# Patient Record
Sex: MALE | Race: WHITE | NOT HISPANIC OR LATINO | Employment: OTHER | URBAN - METROPOLITAN AREA
[De-identification: names, ages, dates, MRNs, and addresses within clinical notes are randomized per-mention and may not be internally consistent; named-entity substitution may affect disease eponyms.]

---

## 2023-11-02 DIAGNOSIS — D3A.8 NEUROENDOCRINE TUMOR OF PANCREAS: Primary | ICD-10-CM

## 2023-11-22 ENCOUNTER — OFFICE VISIT (OUTPATIENT)
Dept: SURGICAL ONCOLOGY | Facility: CLINIC | Age: 78
End: 2023-11-22
Payer: OTHER GOVERNMENT

## 2023-11-22 VITALS
SYSTOLIC BLOOD PRESSURE: 103 MMHG | BODY MASS INDEX: 28.51 KG/M2 | HEART RATE: 80 BPM | HEIGHT: 71 IN | DIASTOLIC BLOOD PRESSURE: 62 MMHG | WEIGHT: 203.63 LBS

## 2023-11-22 DIAGNOSIS — D3A.8 NEUROENDOCRINE TUMOR OF PANCREAS: Primary | ICD-10-CM

## 2023-11-22 DIAGNOSIS — D3A.8 NEUROENDOCRINE TUMOR OF PANCREAS: ICD-10-CM

## 2023-11-22 PROCEDURE — 99999 PR PBB SHADOW E&M-EST. PATIENT-LVL IV: ICD-10-PCS | Mod: PBBFAC,,, | Performed by: SURGERY

## 2023-11-22 PROCEDURE — 99205 OFFICE O/P NEW HI 60 MIN: CPT | Mod: S$PBB,,, | Performed by: SURGERY

## 2023-11-22 PROCEDURE — 99214 OFFICE O/P EST MOD 30 MIN: CPT | Mod: PBBFAC | Performed by: SURGERY

## 2023-11-22 PROCEDURE — 99205 PR OFFICE/OUTPT VISIT, NEW, LEVL V, 60-74 MIN: ICD-10-PCS | Mod: S$PBB,,, | Performed by: SURGERY

## 2023-11-22 PROCEDURE — 99999 PR PBB SHADOW E&M-EST. PATIENT-LVL IV: CPT | Mod: PBBFAC,,, | Performed by: SURGERY

## 2023-11-22 RX ORDER — ACETAMINOPHEN 500 MG
1 TABLET ORAL EVERY MORNING
COMMUNITY

## 2023-11-22 RX ORDER — LOSARTAN POTASSIUM 100 MG/1
100 TABLET ORAL DAILY
COMMUNITY
Start: 2023-09-19

## 2023-11-22 RX ORDER — DENOSUMAB 60 MG/ML
INJECTION SUBCUTANEOUS
COMMUNITY
End: 2024-02-07

## 2023-11-22 RX ORDER — DIPHENOXYLATE HYDROCHLORIDE AND ATROPINE SULFATE 2.5; .025 MG/1; MG/1
1 TABLET ORAL EVERY 6 HOURS PRN
COMMUNITY
Start: 2023-06-15 | End: 2024-02-07

## 2023-11-22 RX ORDER — METOPROLOL SUCCINATE 25 MG/1
TABLET, EXTENDED RELEASE ORAL
COMMUNITY
End: 2024-02-07

## 2023-11-22 RX ORDER — FLUTICASONE PROPIONATE 50 MCG
SPRAY, SUSPENSION (ML) NASAL
COMMUNITY
Start: 2023-09-19 | End: 2024-02-07

## 2023-11-22 RX ORDER — ALLOPURINOL 100 MG/1
100 TABLET ORAL DAILY
COMMUNITY
Start: 2023-09-19

## 2023-11-22 RX ORDER — LORATADINE 10 MG/1
10 TABLET ORAL DAILY
COMMUNITY

## 2023-11-22 RX ORDER — ATORVASTATIN CALCIUM 80 MG/1
40 TABLET, FILM COATED ORAL
COMMUNITY
Start: 2023-09-19 | End: 2024-02-07

## 2023-11-22 RX ORDER — ATENOLOL 50 MG/1
50 TABLET ORAL 2 TIMES DAILY
COMMUNITY
Start: 2023-09-19

## 2023-11-22 RX ORDER — FLECAINIDE ACETATE 100 MG/1
50 TABLET ORAL
COMMUNITY
Start: 2023-09-19 | End: 2024-02-07

## 2023-11-22 RX ORDER — HYDROCHLOROTHIAZIDE 25 MG/1
25 TABLET ORAL
COMMUNITY
Start: 2023-09-19 | End: 2024-02-07

## 2023-11-22 NOTE — PROGRESS NOTES
History & Physical    CHIEF COMPLAINT:  NEUROENDOCRINE TUMOR OF DUODENUM     History of Present Illness:  78 year-old-male referred by Dr. Sheila Butts.  Patient presented to an outside facility in July 2023 with complaints of weakness, decreased appetite and jaundice x 3 weeks.  Abdominal ultrasound showed findings suspicious for distal CBD obstruction secondary to a pancreatic head mass.  CT abd/pel without contrast then showed a suspected ampullary mass or stone causing distal CBD obstruction.  Patient was transferred to [facility] in [place].  MRI done there showed a 2.3 x 3.5 cm mass at the ampulla of Vater causing common bile duct and main pancreatic duct obstruction.  ERCP was performed, the duodenum just proximal to ampulla was ulcerated and infiltrated with tumor; appearance of secondary invasion of the duodenum by pancreatic cancer; distal stricture in the area where the common bile duct passes through the pancreatic head; a 10 mm x 6 cm fully covered metal stent was placed.  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  Bile duct brushings were negative for malignant cells.  CEA at the time of workup was 3.2,  was 2416.  Patient was referred to Dr. Epsarza for EUS however he was unsure how EUS would help with workup as the patient already had stent placement and biopsies performed.  At discharge from the hospital in July, patient requested Hospice services.  He has not had any further workup done since hospital stay.      Greater than 60 minutes was required for complete chart review, imaging review including interpretation of imaging, coordination with referring/other physicians, patient counseling regarding diagnosis/treatment plan, answering questions, medical decision making, and documentation.    Review of patient's allergies indicates:   Allergen Reactions    Influenza virus vaccine, specific Other (See  Comments)       Current Outpatient Medications   Medication Sig Dispense Refill    allopurinoL (ZYLOPRIM) 100 MG tablet 100 mg.      atenoloL (TENORMIN) 50 MG tablet 50 mg.      atorvastatin (LIPITOR) 80 MG tablet 40 mg.      diphenoxylate-atropine 2.5-0.025 mg (LOMOTIL) 2.5-0.025 mg per tablet Take 1 tablet by mouth every 6 (six) hours as needed.      flecainide (TAMBOCOR) 100 MG Tab 50 mg.      fluticasone propionate (FLONASE) 50 mcg/actuation nasal spray USE 1 SPRAY IN EACH NOSTRIL EVERY DAY FOR NASAL ALLERGY      hydroCHLOROthiazide (HYDRODIURIL) 25 MG tablet 25 mg.      losartan (COZAAR) 100 MG tablet 100 mg.      metformin HCl (METFORMIN ORAL) Take 500 mg by mouth once daily.      apixaban (ELIQUIS) 5 mg Tab Take 1 tablet twice a day by oral route for 30 days.      cholecalciferol, vitamin D3, 125 mcg (5,000 unit) Tab Take 1 tablet by mouth every morning.      denosumab (PROLIA) 60 mg/mL Syrg 0 subcutaneously every 6 months      loratadine (CLARITIN) 10 mg tablet       metoprolol succinate (TOPROL-XL) 25 MG 24 hr tablet 1/2 tab(s) orally once a day      VITAMIN B COMPLEX ORAL Take 1 tablet by mouth every morning.       No current facility-administered medications for this visit.       Past Medical History:   Diagnosis Date    POLO (acute kidney injury)     HLD (hyperlipidemia)     HTN (hypertension)     Prostate cancer     PVD (peripheral vascular disease)     Sick sinus syndrome      Past Surgical History:   Procedure Laterality Date    INSERTION OF PACEMAKER       No family history on file.        Review of Systems:  Review of Systems   Constitutional:  Negative for activity change, appetite change, chills, diaphoresis, fatigue and fever.   HENT:  Negative for congestion, ear pain, tinnitus and trouble swallowing.    Eyes:  Negative for photophobia and pain.   Respiratory:  Negative for apnea, cough, choking, chest tightness, shortness of breath and stridor.    Cardiovascular:  Negative for chest pain,  palpitations and leg swelling.   Endocrine: Negative for cold intolerance and heat intolerance.   Genitourinary:  Negative for difficulty urinating, dysuria, enuresis, flank pain, frequency and hematuria.   Musculoskeletal:  Negative for arthralgias, back pain and gait problem.   Neurological:  Negative for dizziness, seizures, syncope, facial asymmetry, speech difficulty, weakness, light-headedness, numbness and headaches.   Psychiatric/Behavioral:  Negative for agitation, behavioral problems, confusion and decreased concentration.    All other systems reviewed and are negative.      OBJECTIVE:     Vital Signs (Most Recent)              Physical Exam:  Physical Exam  Constitutional:       General: He is not in acute distress.     Appearance: Normal appearance. He is well-developed and normal weight. He is not ill-appearing, toxic-appearing or diaphoretic.   HENT:      Head: Normocephalic and atraumatic.      Right Ear: Hearing and external ear normal.      Left Ear: Hearing and external ear normal.      Nose: No congestion or rhinorrhea.      Mouth/Throat:      Mouth: Mucous membranes are moist.      Pharynx: Oropharynx is clear. No oropharyngeal exudate.   Eyes:      General: Lids are normal. Gaze aligned appropriately. No scleral icterus.     Extraocular Movements: Extraocular movements intact.      Right eye: Normal extraocular motion and no nystagmus.      Left eye: Normal extraocular motion and no nystagmus.      Conjunctiva/sclera: Conjunctivae normal.      Right eye: Right conjunctiva is not injected.      Left eye: Left conjunctiva is not injected.      Pupils: Pupils are equal, round, and reactive to light.   Neck:      Vascular: No carotid bruit or JVD.      Trachea: Trachea and phonation normal.   Cardiovascular:      Rate and Rhythm: Normal rate and regular rhythm.      Pulses: Normal pulses.      Heart sounds: Normal heart sounds. No murmur heard.     No friction rub. No gallop.   Pulmonary:       Effort: Pulmonary effort is normal. No tachypnea, accessory muscle usage, respiratory distress or retractions.      Breath sounds: Normal breath sounds and air entry. No stridor or decreased air movement. No wheezing or rhonchi.   Chest:      Chest wall: No mass, deformity, swelling, tenderness or crepitus.   Abdominal:      General: Abdomen is flat. Bowel sounds are normal. There is no distension. There are no signs of injury.      Palpations: Abdomen is soft. There is no shifting dullness, fluid wave, hepatomegaly, splenomegaly or mass.      Tenderness: There is no abdominal tenderness. There is no guarding or rebound.      Hernia: No hernia is present.   Musculoskeletal:         General: No swelling or tenderness.      Cervical back: Normal range of motion and neck supple. No rigidity, tenderness or crepitus.   Lymphadenopathy:      Head:      Right side of head: No submental, submandibular or occipital adenopathy.      Left side of head: No submental, submandibular or occipital adenopathy.      Cervical: No cervical adenopathy.      Right cervical: No superficial or posterior cervical adenopathy.     Left cervical: No superficial or posterior cervical adenopathy.      Upper Body:      Right upper body: No supraclavicular or axillary adenopathy.      Left upper body: No supraclavicular or axillary adenopathy.      Lower Body: No right inguinal adenopathy. No left inguinal adenopathy.   Skin:     General: Skin is warm.      Capillary Refill: Capillary refill takes less than 2 seconds.      Coloration: Skin is not cyanotic, jaundiced, mottled or pale.      Findings: No bruising, ecchymosis, erythema, lesion, petechiae or rash.      Nails: There is no clubbing.   Neurological:      General: No focal deficit present.      Mental Status: He is alert and oriented to person, place, and time.      Cranial Nerves: No cranial nerve deficit or facial asymmetry.      Sensory: No sensory deficit.      Motor: No weakness,  tremor, atrophy or abnormal muscle tone.      Coordination: Coordination normal.      Gait: Gait normal.      Deep Tendon Reflexes: Reflexes normal.   Psychiatric:         Attention and Perception: Attention and perception normal.         Mood and Affect: Mood normal. Mood is not anxious or depressed. Affect is not blunt or flat.         Speech: Speech normal. Speech is not slurred.         Behavior: Behavior normal. Behavior is cooperative.           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  Previous imaging showed no evidence of metastatic disease but this was several months ago.    Diagnosis, staging, prognosis and treatment guidelines for high-grade pancreatic neuroendocrine cancer were discussed with the patient in detail, all questions were addressed.Using visual aids and drawings, I described the anatomy and potential surgical procedures.  I explained that surgical resection is indicated and recommended for high-grade lesions.  He is unsure whether he would like to proceed with surgical intervention.    Update MRI of the abdomen with pancreas protocol, CT scan of the chest without contrast    Return to clinic after imaging to discuss treatment plan    Saeed Heredia MD  Surgical Oncology  Complex General, Gastrointestinal and Hepatobiliary Surgery

## 2023-11-27 ENCOUNTER — DOCUMENTATION ONLY (OUTPATIENT)
Dept: SURGICAL ONCOLOGY | Facility: CLINIC | Age: 78
End: 2023-11-27

## 2023-11-27 NOTE — PROGRESS NOTES
Per APPT desk, Caroline Noble from [facility] tried contacting patient to schedule his MRI/CT with their office and wasn't able to reach the patient. She left a voicemail for him to return her call. Once this has been scheduled and patient has been reached, I will schedule rtc. cpl

## 2023-12-26 ENCOUNTER — OFFICE VISIT (OUTPATIENT)
Dept: SURGICAL ONCOLOGY | Facility: CLINIC | Age: 78
End: 2023-12-26
Payer: OTHER GOVERNMENT

## 2023-12-26 VITALS
DIASTOLIC BLOOD PRESSURE: 72 MMHG | BODY MASS INDEX: 27.91 KG/M2 | HEIGHT: 71 IN | WEIGHT: 199.38 LBS | HEART RATE: 72 BPM | SYSTOLIC BLOOD PRESSURE: 117 MMHG

## 2023-12-26 DIAGNOSIS — Z01.818 PREOP TESTING: ICD-10-CM

## 2023-12-26 DIAGNOSIS — D3A.8 NEUROENDOCRINE TUMOR OF PANCREAS: Primary | ICD-10-CM

## 2023-12-26 PROCEDURE — 99215 PR OFFICE/OUTPT VISIT, EST, LEVL V, 40-54 MIN: ICD-10-PCS | Mod: S$PBB,,, | Performed by: SURGERY

## 2023-12-26 PROCEDURE — 99999 PR PBB SHADOW E&M-EST. PATIENT-LVL III: ICD-10-PCS | Mod: PBBFAC,,, | Performed by: SURGERY

## 2023-12-26 PROCEDURE — 99213 OFFICE O/P EST LOW 20 MIN: CPT | Mod: PBBFAC | Performed by: SURGERY

## 2023-12-26 PROCEDURE — 99999 PR PBB SHADOW E&M-EST. PATIENT-LVL III: CPT | Mod: PBBFAC,,, | Performed by: SURGERY

## 2023-12-26 PROCEDURE — 99215 OFFICE O/P EST HI 40 MIN: CPT | Mod: S$PBB,,, | Performed by: SURGERY

## 2023-12-26 NOTE — PROGRESS NOTES
History & Physical    CHIEF COMPLAINT:  NEUROENDOCRINE TUMOR OF DUODENUM     History of Present Illness:  78 year-old-male referred by Dr. Sheila Butts.  Patient presented to an outside facility in July 2023 with complaints of weakness, decreased appetite and jaundice x 3 weeks.  Abdominal ultrasound showed findings suspicious for distal CBD obstruction secondary to a pancreatic head mass.  CT abd/pel without contrast then showed a suspected ampullary mass or stone causing distal CBD obstruction.  Patient was transferred to [facility] in [place].  MRI done there showed a 2.3 x 3.5 cm mass at the ampulla of Vater causing common bile duct and main pancreatic duct obstruction.  ERCP was performed, the duodenum just proximal to ampulla was ulcerated and infiltrated with tumor; appearance of secondary invasion of the duodenum by pancreatic cancer; distal stricture in the area where the common bile duct passes through the pancreatic head; a 10 mm x 6 cm fully covered metal stent was placed.  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  Bile duct brushings were negative for malignant cells.  CEA at the time of workup was 3.2,  was 2416.  Patient was referred to Dr. Esparza for EUS however he was unsure how EUS would help with workup as the patient already had stent placement and biopsies performed.      The patient returns today after undergoing updated MRI of the abdomen with pancreas protocol and CT scan of the chest, I personally reviewed and interpreted the images and report.  There is duodenal wall thickening without evidence of progression of disease, no obvious lymphadenopathy, no evidence of liver either metastasis.  No evidence of intrathoracic metastasis.  I discussed these results with him today.    Greater than 60 minutes was required for complete chart review, imaging review including interpretation of imaging,  coordination with referring/other physicians, patient counseling regarding diagnosis/treatment plan, answering questions, medical decision making, and documentation.    Review of patient's allergies indicates:   Allergen Reactions    Influenza virus vaccine, specific Other (See Comments)       Current Outpatient Medications   Medication Sig Dispense Refill    allopurinoL (ZYLOPRIM) 100 MG tablet 100 mg.      apixaban (ELIQUIS) 5 mg Tab Take 1 tablet twice a day by oral route for 30 days.      atenoloL (TENORMIN) 50 MG tablet 50 mg.      atorvastatin (LIPITOR) 80 MG tablet 40 mg.      cholecalciferol, vitamin D3, 125 mcg (5,000 unit) Tab Take 1 tablet by mouth every morning.      denosumab (PROLIA) 60 mg/mL Syrg 0 subcutaneously every 6 months      diphenoxylate-atropine 2.5-0.025 mg (LOMOTIL) 2.5-0.025 mg per tablet Take 1 tablet by mouth every 6 (six) hours as needed.      flecainide (TAMBOCOR) 100 MG Tab 50 mg.      fluticasone propionate (FLONASE) 50 mcg/actuation nasal spray USE 1 SPRAY IN EACH NOSTRIL EVERY DAY FOR NASAL ALLERGY      hydroCHLOROthiazide (HYDRODIURIL) 25 MG tablet 25 mg.      loratadine (CLARITIN) 10 mg tablet       losartan (COZAAR) 100 MG tablet 100 mg.      metformin HCl (METFORMIN ORAL) Take 500 mg by mouth once daily.      metoprolol succinate (TOPROL-XL) 25 MG 24 hr tablet 1/2 tab(s) orally once a day      VITAMIN B COMPLEX ORAL Take 1 tablet by mouth every morning.       No current facility-administered medications for this visit.       Past Medical History:   Diagnosis Date    POLO (acute kidney injury)     HLD (hyperlipidemia)     HTN (hypertension)     Prostate cancer     PVD (peripheral vascular disease)     Sick sinus syndrome      Past Surgical History:   Procedure Laterality Date    INSERTION OF PACEMAKER       History reviewed. No pertinent family history.  Social History     Tobacco Use    Smoking status: Former     Types: Cigarettes     Passive exposure: Past    Smokeless  "tobacco: Never    Tobacco comments:     Quit over 30 yrs ago        Review of Systems:  Review of Systems   Constitutional:  Negative for activity change, appetite change, chills, diaphoresis, fatigue and fever.   HENT:  Negative for congestion, ear pain, tinnitus and trouble swallowing.    Eyes:  Negative for photophobia and pain.   Respiratory:  Negative for apnea, cough, choking, chest tightness, shortness of breath and stridor.    Cardiovascular:  Negative for chest pain, palpitations and leg swelling.   Endocrine: Negative for cold intolerance and heat intolerance.   Genitourinary:  Negative for difficulty urinating, dysuria, enuresis, flank pain, frequency and hematuria.   Musculoskeletal:  Negative for arthralgias, back pain and gait problem.   Neurological:  Negative for dizziness, seizures, syncope, facial asymmetry, speech difficulty, weakness, light-headedness, numbness and headaches.   Psychiatric/Behavioral:  Negative for agitation, behavioral problems, confusion and decreased concentration.    All other systems reviewed and are negative.      OBJECTIVE:     Vital Signs (Most Recent)  Pulse: 72 (12/26/23 1449)  BP: 117/72 (12/26/23 1449)  5' 11" (1.803 m)  90.4 kg (199 lb 6.4 oz)     Physical Exam:  Physical Exam  Constitutional:       General: He is not in acute distress.     Appearance: Normal appearance. He is well-developed and normal weight. He is not ill-appearing, toxic-appearing or diaphoretic.   HENT:      Head: Normocephalic and atraumatic.      Right Ear: Hearing and external ear normal.      Left Ear: Hearing and external ear normal.      Nose: No congestion or rhinorrhea.      Mouth/Throat:      Mouth: Mucous membranes are moist.      Pharynx: Oropharynx is clear. No oropharyngeal exudate.   Eyes:      General: Lids are normal. Gaze aligned appropriately. No scleral icterus.     Extraocular Movements: Extraocular movements intact.      Right eye: Normal extraocular motion and no nystagmus. "      Left eye: Normal extraocular motion and no nystagmus.      Conjunctiva/sclera: Conjunctivae normal.      Right eye: Right conjunctiva is not injected.      Left eye: Left conjunctiva is not injected.      Pupils: Pupils are equal, round, and reactive to light.   Neck:      Vascular: No carotid bruit or JVD.      Trachea: Trachea and phonation normal.   Cardiovascular:      Rate and Rhythm: Normal rate and regular rhythm.      Pulses: Normal pulses.      Heart sounds: Normal heart sounds. No murmur heard.     No friction rub. No gallop.   Pulmonary:      Effort: Pulmonary effort is normal. No tachypnea, accessory muscle usage, respiratory distress or retractions.      Breath sounds: Normal breath sounds and air entry. No stridor or decreased air movement. No wheezing or rhonchi.   Chest:      Chest wall: No mass, deformity, swelling, tenderness or crepitus.   Abdominal:      General: Abdomen is flat. Bowel sounds are normal. There is no distension. There are no signs of injury.      Palpations: Abdomen is soft. There is no shifting dullness, fluid wave, hepatomegaly, splenomegaly or mass.      Tenderness: There is no abdominal tenderness. There is no guarding or rebound.      Hernia: No hernia is present.   Musculoskeletal:         General: No swelling or tenderness.      Cervical back: Normal range of motion and neck supple. No rigidity, tenderness or crepitus.   Lymphadenopathy:      Head:      Right side of head: No submental, submandibular or occipital adenopathy.      Left side of head: No submental, submandibular or occipital adenopathy.      Cervical: No cervical adenopathy.      Right cervical: No superficial or posterior cervical adenopathy.     Left cervical: No superficial or posterior cervical adenopathy.      Upper Body:      Right upper body: No supraclavicular or axillary adenopathy.      Left upper body: No supraclavicular or axillary adenopathy.      Lower Body: No right inguinal adenopathy. No  left inguinal adenopathy.   Skin:     General: Skin is warm.      Capillary Refill: Capillary refill takes less than 2 seconds.      Coloration: Skin is not cyanotic, jaundiced, mottled or pale.      Findings: No bruising, ecchymosis, erythema, lesion, petechiae or rash.      Nails: There is no clubbing.   Neurological:      General: No focal deficit present.      Mental Status: He is alert and oriented to person, place, and time.      Cranial Nerves: No cranial nerve deficit or facial asymmetry.      Sensory: No sensory deficit.      Motor: No weakness, tremor, atrophy or abnormal muscle tone.      Coordination: Coordination normal.      Gait: Gait normal.      Deep Tendon Reflexes: Reflexes normal.   Psychiatric:         Attention and Perception: Attention and perception normal.         Mood and Affect: Mood normal. Mood is not anxious or depressed. Affect is not blunt or flat.         Speech: Speech normal. Speech is not slurred.         Behavior: Behavior normal. Behavior is cooperative.           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  No evidence of metastatic disease on updated imaging    Diagnosis, staging, prognosis and treatment guidelines for high-grade pancreatic neuroendocrine cancer were discussed with the patient in detail, all questions were addressed.Using visual aids and drawings, I described the anatomy and potential surgical procedures.  I explained that surgical resection is indicated and recommended for high-grade lesions.      Preoperative cardiac evaluation    Schedule laparoscopic/robotic pancreaticoduodenectomy/Whipple procedure    The risks and benefits of the procedure were explained in detail using layman terms, including the pros and cons of any implant that may be used, all questions were addressed, the patient gives consent to proceed      Saeed Heredia MD  Surgical Oncology  Complex  General, Gastrointestinal and Hepatobiliary Surgery

## 2024-01-25 DIAGNOSIS — D3A.8 NEUROENDOCRINE TUMOR OF PANCREAS: Primary | ICD-10-CM

## 2024-01-25 DIAGNOSIS — D3A.8 NEUROENDOCRINE TUMOR: ICD-10-CM

## 2024-01-25 RX ORDER — ALVIMOPAN 12 MG/1
12 CAPSULE ORAL ONCE
Status: CANCELLED | OUTPATIENT
Start: 2024-02-19 | End: 2024-02-25

## 2024-01-25 RX ORDER — ENOXAPARIN SODIUM 300 MG/3ML
30 INJECTION INTRAVENOUS; SUBCUTANEOUS EVERY 24 HOURS
Status: CANCELLED | OUTPATIENT
Start: 2024-01-25

## 2024-01-25 RX ORDER — HYDROCODONE BITARTRATE AND ACETAMINOPHEN 500; 5 MG/1; MG/1
TABLET ORAL
Status: CANCELLED | OUTPATIENT
Start: 2024-02-19

## 2024-02-07 ENCOUNTER — HOSPITAL ENCOUNTER (OUTPATIENT)
Dept: RADIOLOGY | Facility: HOSPITAL | Age: 79
Discharge: HOME | End: 2024-02-07
Attending: SURGERY
Payer: MEDICARE

## 2024-02-07 ENCOUNTER — OFFICE VISIT (OUTPATIENT)
Dept: SURGICAL ONCOLOGY | Facility: CLINIC | Age: 79
End: 2024-02-07
Payer: OTHER GOVERNMENT

## 2024-02-07 VITALS
HEIGHT: 70 IN | WEIGHT: 191.81 LBS | DIASTOLIC BLOOD PRESSURE: 75 MMHG | BODY MASS INDEX: 27.46 KG/M2 | SYSTOLIC BLOOD PRESSURE: 122 MMHG

## 2024-02-07 DIAGNOSIS — D3A.8 NEUROENDOCRINE TUMOR OF PANCREAS: Primary | ICD-10-CM

## 2024-02-07 DIAGNOSIS — Z01.818 PREOP TESTING: ICD-10-CM

## 2024-02-07 DIAGNOSIS — D3A.8 NEUROENDOCRINE TUMOR OF PANCREAS: ICD-10-CM

## 2024-02-07 PROCEDURE — 99213 OFFICE O/P EST LOW 20 MIN: CPT | Mod: PBBFAC,25 | Performed by: SURGERY

## 2024-02-07 PROCEDURE — 99214 OFFICE O/P EST MOD 30 MIN: CPT | Mod: S$PBB,,, | Performed by: SURGERY

## 2024-02-07 PROCEDURE — 71045 X-RAY EXAM CHEST 1 VIEW: CPT | Mod: TC

## 2024-02-07 PROCEDURE — 99999 PR PBB SHADOW E&M-EST. PATIENT-LVL III: CPT | Mod: PBBFAC,,, | Performed by: SURGERY

## 2024-02-07 NOTE — H&P (VIEW-ONLY)
History & Physical    CHIEF COMPLAINT:  NEUROENDOCRINE TUMOR OF DUODENUM     History of Present Illness:  78 year-old-male referred by Dr. Sheila Butts.  Patient presented to an outside facility in July 2023 with complaints of weakness, decreased appetite and jaundice x 3 weeks.  Abdominal ultrasound showed findings suspicious for distal CBD obstruction secondary to a pancreatic head mass.  CT abd/pel without contrast then showed a suspected ampullary mass or stone causing distal CBD obstruction.  Patient was transferred to [facility] in [place].  MRI done there showed a 2.3 x 3.5 cm mass at the ampulla of Vater causing common bile duct and main pancreatic duct obstruction.  ERCP was performed, the duodenum just proximal to ampulla was ulcerated and infiltrated with tumor; appearance of secondary invasion of the duodenum by pancreatic cancer; distal stricture in the area where the common bile duct passes through the pancreatic head; a 10 mm x 6 cm fully covered metal stent was placed.  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  Bile duct brushings were negative for malignant cells.  CEA at the time of workup was 3.2,  was 2416.  Patient was referred to Dr. Esparza for EUS however he was unsure how EUS would help with workup as the patient already had stent placement and biopsies performed.      The patient returns today after undergoing updated MRI of the abdomen with pancreas protocol and CT scan of the chest, I personally reviewed and interpreted the images and report.  There is duodenal wall thickening without evidence of progression of disease, no obvious lymphadenopathy, no evidence of liver either metastasis.  No evidence of intrathoracic metastasis.  I discussed these results with him today.    Greater than 60 minutes was required for complete chart review, imaging review including interpretation of imaging,  coordination with referring/other physicians, patient counseling regarding diagnosis/treatment plan, answering questions, medical decision making, and documentation.    Review of patient's allergies indicates:   Allergen Reactions    Influenza virus vaccine, specific Other (See Comments)       Current Outpatient Medications   Medication Sig Dispense Refill    allopurinoL (ZYLOPRIM) 100 MG tablet 100 mg.      atenoloL (TENORMIN) 50 MG tablet 50 mg.      cholecalciferol, vitamin D3, 125 mcg (5,000 unit) Tab Take 1 tablet by mouth every morning.      loratadine (CLARITIN) 10 mg tablet       losartan (COZAAR) 100 MG tablet 100 mg.      metformin HCl (METFORMIN ORAL) Take 500 mg by mouth once daily.      VITAMIN B COMPLEX ORAL Take 1 tablet by mouth every morning.       No current facility-administered medications for this visit.       Past Medical History:   Diagnosis Date    POLO (acute kidney injury)     HLD (hyperlipidemia)     HTN (hypertension)     Prostate cancer     PVD (peripheral vascular disease)     Sick sinus syndrome      Past Surgical History:   Procedure Laterality Date    INSERTION OF PACEMAKER       History reviewed. No pertinent family history.  Social History     Tobacco Use    Smoking status: Former     Types: Cigarettes     Passive exposure: Past    Smokeless tobacco: Never    Tobacco comments:     Quit over 30 yrs ago        Review of Systems:  Review of Systems   Constitutional:  Negative for activity change, appetite change, chills, diaphoresis, fatigue and fever.   HENT:  Negative for congestion, ear pain, tinnitus and trouble swallowing.    Eyes:  Negative for photophobia and pain.   Respiratory:  Negative for apnea, cough, choking, chest tightness, shortness of breath and stridor.    Cardiovascular:  Negative for chest pain, palpitations and leg swelling.   Endocrine: Negative for cold intolerance and heat intolerance.   Genitourinary:  Negative for difficulty urinating, dysuria, enuresis,  "flank pain, frequency and hematuria.   Musculoskeletal:  Negative for arthralgias, back pain and gait problem.   Neurological:  Negative for dizziness, seizures, syncope, facial asymmetry, speech difficulty, weakness, light-headedness, numbness and headaches.   Psychiatric/Behavioral:  Negative for agitation, behavioral problems, confusion and decreased concentration.    All other systems reviewed and are negative.      OBJECTIVE:     Vital Signs (Most Recent)  Pulse: (P) 71 (02/07/24 1052)  BP: 122/75 (02/07/24 1052)  5' 10" (1.778 m)  87 kg (191 lb 12.8 oz)     Physical Exam:  Physical Exam  Constitutional:       General: He is not in acute distress.     Appearance: Normal appearance. He is well-developed and normal weight. He is not ill-appearing, toxic-appearing or diaphoretic.   HENT:      Head: Normocephalic and atraumatic.      Right Ear: Hearing and external ear normal.      Left Ear: Hearing and external ear normal.      Nose: No congestion or rhinorrhea.      Mouth/Throat:      Mouth: Mucous membranes are moist.      Pharynx: Oropharynx is clear. No oropharyngeal exudate.   Eyes:      General: Lids are normal. Gaze aligned appropriately. No scleral icterus.     Extraocular Movements: Extraocular movements intact.      Right eye: Normal extraocular motion and no nystagmus.      Left eye: Normal extraocular motion and no nystagmus.      Conjunctiva/sclera: Conjunctivae normal.      Right eye: Right conjunctiva is not injected.      Left eye: Left conjunctiva is not injected.      Pupils: Pupils are equal, round, and reactive to light.   Neck:      Vascular: No carotid bruit or JVD.      Trachea: Trachea and phonation normal.   Cardiovascular:      Rate and Rhythm: Normal rate and regular rhythm.      Pulses: Normal pulses.      Heart sounds: Normal heart sounds. No murmur heard.     No friction rub. No gallop.   Pulmonary:      Effort: Pulmonary effort is normal. No tachypnea, accessory muscle usage, " respiratory distress or retractions.      Breath sounds: Normal breath sounds and air entry. No stridor or decreased air movement. No wheezing or rhonchi.   Chest:      Chest wall: No mass, deformity, swelling, tenderness or crepitus.   Abdominal:      General: Abdomen is flat. Bowel sounds are normal. There is no distension. There are no signs of injury.      Palpations: Abdomen is soft. There is no shifting dullness, fluid wave, hepatomegaly, splenomegaly or mass.      Tenderness: There is no abdominal tenderness. There is no guarding or rebound.      Hernia: No hernia is present.   Musculoskeletal:         General: No swelling or tenderness.      Cervical back: Normal range of motion and neck supple. No rigidity, tenderness or crepitus.   Lymphadenopathy:      Head:      Right side of head: No submental, submandibular or occipital adenopathy.      Left side of head: No submental, submandibular or occipital adenopathy.      Cervical: No cervical adenopathy.      Right cervical: No superficial or posterior cervical adenopathy.     Left cervical: No superficial or posterior cervical adenopathy.      Upper Body:      Right upper body: No supraclavicular or axillary adenopathy.      Left upper body: No supraclavicular or axillary adenopathy.      Lower Body: No right inguinal adenopathy. No left inguinal adenopathy.   Skin:     General: Skin is warm.      Capillary Refill: Capillary refill takes less than 2 seconds.      Coloration: Skin is not cyanotic, jaundiced, mottled or pale.      Findings: No bruising, ecchymosis, erythema, lesion, petechiae or rash.      Nails: There is no clubbing.   Neurological:      General: No focal deficit present.      Mental Status: He is alert and oriented to person, place, and time.      Cranial Nerves: No cranial nerve deficit or facial asymmetry.      Sensory: No sensory deficit.      Motor: No weakness, tremor, atrophy or abnormal muscle tone.      Coordination: Coordination  normal.      Gait: Gait normal.      Deep Tendon Reflexes: Reflexes normal.   Psychiatric:         Attention and Perception: Attention and perception normal.         Mood and Affect: Mood normal. Mood is not anxious or depressed. Affect is not blunt or flat.         Speech: Speech normal. Speech is not slurred.         Behavior: Behavior normal. Behavior is cooperative.           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  No evidence of metastatic disease on updated imaging    Diagnosis, staging, prognosis and treatment guidelines for high-grade pancreatic neuroendocrine cancer were discussed with the patient in detail, all questions were addressed.Using visual aids and drawings, I described the anatomy and potential surgical procedures.  I explained that surgical resection is indicated and recommended for high-grade lesions.      Preoperative cardiac evaluation completed, pt is off Eliquis    Schedule laparoscopic/robotic pancreaticoduodenectomy/Whipple procedure    The risks and benefits of the procedure were explained in detail using layman terms, including the pros and cons of any implant that may be used, all questions were addressed, the patient gives consent to proceed      Saeed Heredia MD  Surgical Oncology  Complex General, Gastrointestinal and Hepatobiliary Surgery

## 2024-02-07 NOTE — PROGRESS NOTES
History & Physical    CHIEF COMPLAINT:  NEUROENDOCRINE TUMOR OF DUODENUM     History of Present Illness:  78 year-old-male referred by Dr. Sheila Butts.  Patient presented to an outside facility in July 2023 with complaints of weakness, decreased appetite and jaundice x 3 weeks.  Abdominal ultrasound showed findings suspicious for distal CBD obstruction secondary to a pancreatic head mass.  CT abd/pel without contrast then showed a suspected ampullary mass or stone causing distal CBD obstruction.  Patient was transferred to [facility] in [place].  MRI done there showed a 2.3 x 3.5 cm mass at the ampulla of Vater causing common bile duct and main pancreatic duct obstruction.  ERCP was performed, the duodenum just proximal to ampulla was ulcerated and infiltrated with tumor; appearance of secondary invasion of the duodenum by pancreatic cancer; distal stricture in the area where the common bile duct passes through the pancreatic head; a 10 mm x 6 cm fully covered metal stent was placed.  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  Bile duct brushings were negative for malignant cells.  CEA at the time of workup was 3.2,  was 2416.  Patient was referred to Dr. Esparza for EUS however he was unsure how EUS would help with workup as the patient already had stent placement and biopsies performed.      The patient returns today after undergoing updated MRI of the abdomen with pancreas protocol and CT scan of the chest, I personally reviewed and interpreted the images and report.  There is duodenal wall thickening without evidence of progression of disease, no obvious lymphadenopathy, no evidence of liver either metastasis.  No evidence of intrathoracic metastasis.  I discussed these results with him today.    Greater than 60 minutes was required for complete chart review, imaging review including interpretation of imaging,  coordination with referring/other physicians, patient counseling regarding diagnosis/treatment plan, answering questions, medical decision making, and documentation.    Review of patient's allergies indicates:   Allergen Reactions    Influenza virus vaccine, specific Other (See Comments)       Current Outpatient Medications   Medication Sig Dispense Refill    allopurinoL (ZYLOPRIM) 100 MG tablet 100 mg.      atenoloL (TENORMIN) 50 MG tablet 50 mg.      cholecalciferol, vitamin D3, 125 mcg (5,000 unit) Tab Take 1 tablet by mouth every morning.      loratadine (CLARITIN) 10 mg tablet       losartan (COZAAR) 100 MG tablet 100 mg.      metformin HCl (METFORMIN ORAL) Take 500 mg by mouth once daily.      VITAMIN B COMPLEX ORAL Take 1 tablet by mouth every morning.       No current facility-administered medications for this visit.       Past Medical History:   Diagnosis Date    POLO (acute kidney injury)     HLD (hyperlipidemia)     HTN (hypertension)     Prostate cancer     PVD (peripheral vascular disease)     Sick sinus syndrome      Past Surgical History:   Procedure Laterality Date    INSERTION OF PACEMAKER       History reviewed. No pertinent family history.  Social History     Tobacco Use    Smoking status: Former     Types: Cigarettes     Passive exposure: Past    Smokeless tobacco: Never    Tobacco comments:     Quit over 30 yrs ago        Review of Systems:  Review of Systems   Constitutional:  Negative for activity change, appetite change, chills, diaphoresis, fatigue and fever.   HENT:  Negative for congestion, ear pain, tinnitus and trouble swallowing.    Eyes:  Negative for photophobia and pain.   Respiratory:  Negative for apnea, cough, choking, chest tightness, shortness of breath and stridor.    Cardiovascular:  Negative for chest pain, palpitations and leg swelling.   Endocrine: Negative for cold intolerance and heat intolerance.   Genitourinary:  Negative for difficulty urinating, dysuria, enuresis,  "flank pain, frequency and hematuria.   Musculoskeletal:  Negative for arthralgias, back pain and gait problem.   Neurological:  Negative for dizziness, seizures, syncope, facial asymmetry, speech difficulty, weakness, light-headedness, numbness and headaches.   Psychiatric/Behavioral:  Negative for agitation, behavioral problems, confusion and decreased concentration.    All other systems reviewed and are negative.      OBJECTIVE:     Vital Signs (Most Recent)  Pulse: (P) 71 (02/07/24 1052)  BP: 122/75 (02/07/24 1052)  5' 10" (1.778 m)  87 kg (191 lb 12.8 oz)     Physical Exam:  Physical Exam  Constitutional:       General: He is not in acute distress.     Appearance: Normal appearance. He is well-developed and normal weight. He is not ill-appearing, toxic-appearing or diaphoretic.   HENT:      Head: Normocephalic and atraumatic.      Right Ear: Hearing and external ear normal.      Left Ear: Hearing and external ear normal.      Nose: No congestion or rhinorrhea.      Mouth/Throat:      Mouth: Mucous membranes are moist.      Pharynx: Oropharynx is clear. No oropharyngeal exudate.   Eyes:      General: Lids are normal. Gaze aligned appropriately. No scleral icterus.     Extraocular Movements: Extraocular movements intact.      Right eye: Normal extraocular motion and no nystagmus.      Left eye: Normal extraocular motion and no nystagmus.      Conjunctiva/sclera: Conjunctivae normal.      Right eye: Right conjunctiva is not injected.      Left eye: Left conjunctiva is not injected.      Pupils: Pupils are equal, round, and reactive to light.   Neck:      Vascular: No carotid bruit or JVD.      Trachea: Trachea and phonation normal.   Cardiovascular:      Rate and Rhythm: Normal rate and regular rhythm.      Pulses: Normal pulses.      Heart sounds: Normal heart sounds. No murmur heard.     No friction rub. No gallop.   Pulmonary:      Effort: Pulmonary effort is normal. No tachypnea, accessory muscle usage, " respiratory distress or retractions.      Breath sounds: Normal breath sounds and air entry. No stridor or decreased air movement. No wheezing or rhonchi.   Chest:      Chest wall: No mass, deformity, swelling, tenderness or crepitus.   Abdominal:      General: Abdomen is flat. Bowel sounds are normal. There is no distension. There are no signs of injury.      Palpations: Abdomen is soft. There is no shifting dullness, fluid wave, hepatomegaly, splenomegaly or mass.      Tenderness: There is no abdominal tenderness. There is no guarding or rebound.      Hernia: No hernia is present.   Musculoskeletal:         General: No swelling or tenderness.      Cervical back: Normal range of motion and neck supple. No rigidity, tenderness or crepitus.   Lymphadenopathy:      Head:      Right side of head: No submental, submandibular or occipital adenopathy.      Left side of head: No submental, submandibular or occipital adenopathy.      Cervical: No cervical adenopathy.      Right cervical: No superficial or posterior cervical adenopathy.     Left cervical: No superficial or posterior cervical adenopathy.      Upper Body:      Right upper body: No supraclavicular or axillary adenopathy.      Left upper body: No supraclavicular or axillary adenopathy.      Lower Body: No right inguinal adenopathy. No left inguinal adenopathy.   Skin:     General: Skin is warm.      Capillary Refill: Capillary refill takes less than 2 seconds.      Coloration: Skin is not cyanotic, jaundiced, mottled or pale.      Findings: No bruising, ecchymosis, erythema, lesion, petechiae or rash.      Nails: There is no clubbing.   Neurological:      General: No focal deficit present.      Mental Status: He is alert and oriented to person, place, and time.      Cranial Nerves: No cranial nerve deficit or facial asymmetry.      Sensory: No sensory deficit.      Motor: No weakness, tremor, atrophy or abnormal muscle tone.      Coordination: Coordination  normal.      Gait: Gait normal.      Deep Tendon Reflexes: Reflexes normal.   Psychiatric:         Attention and Perception: Attention and perception normal.         Mood and Affect: Mood normal. Mood is not anxious or depressed. Affect is not blunt or flat.         Speech: Speech normal. Speech is not slurred.         Behavior: Behavior normal. Behavior is cooperative.           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  No evidence of metastatic disease on updated imaging    Diagnosis, staging, prognosis and treatment guidelines for high-grade pancreatic neuroendocrine cancer were discussed with the patient in detail, all questions were addressed.Using visual aids and drawings, I described the anatomy and potential surgical procedures.  I explained that surgical resection is indicated and recommended for high-grade lesions.      Preoperative cardiac evaluation completed, pt is off Eliquis    Schedule laparoscopic/robotic pancreaticoduodenectomy/Whipple procedure    The risks and benefits of the procedure were explained in detail using layman terms, including the pros and cons of any implant that may be used, all questions were addressed, the patient gives consent to proceed      Saeed Heredia MD  Surgical Oncology  Complex General, Gastrointestinal and Hepatobiliary Surgery

## 2024-02-08 RX ORDER — FLECAINIDE ACETATE 50 MG/1
50 TABLET ORAL EVERY 12 HOURS
COMMUNITY

## 2024-02-08 RX ORDER — FLUTICASONE PROPIONATE 50 MCG
1 SPRAY, SUSPENSION (ML) NASAL DAILY
COMMUNITY

## 2024-02-12 ENCOUNTER — ANESTHESIA EVENT (OUTPATIENT)
Dept: SURGERY | Facility: HOSPITAL | Age: 79
End: 2024-02-12
Payer: OTHER GOVERNMENT

## 2024-02-15 DIAGNOSIS — D3A.8 NEUROENDOCRINE TUMOR OF PANCREAS: Primary | ICD-10-CM

## 2024-02-15 RX ORDER — METRONIDAZOLE 500 MG/100ML
500 INJECTION, SOLUTION INTRAVENOUS
Status: CANCELLED | OUTPATIENT
Start: 2024-02-19 | End: 2024-02-19

## 2024-02-16 NOTE — PRE-PROCEDURE INSTRUCTIONS
"[facility]: Outpatient Surgery  Preprocedure Instructions    Expectations: "Because of inconsistent procedure completion times, an unexpected wait may occur. The physicians would like you to be here to prepare in the event they run ahead of time. We will make you as comfortable as possible and keep you informed. We apologize in advance if this happens."     Your arrival time for your surgery or procedure is 0600.  We ask patients to arrive about 2 hours before surgery to allow for enough time to review your health history & medications, start your IV, complete any outstanding labwork or tests, and meet your Anesthesiologist.    We are located at [facility], [address] [place] [address].    Enter through the [place] entrance next to the Emergency Room, and come to the 6th floor to the Outpatient Surgery Department.    If you are in need of a wheelchair the morning of surgery please call [phone] about 15 minutes before you arrive. Parking is available in our parking garage located off [facility], between the hospital and [facility].      Visitory Policy:  You are allowed 2 adult visitors to be with you in the hospital. Please, no switching visitors in pre-op area. All hospital visitors should be in good current health.  No small children.  We will update you and your family hourly on the progression of surgery and any unexpected delays.      What to Bring:  Please have your ID, insurance cards, and all home medication bottles with you at check in.  Bring your CPAP machine if one is used at home.     Fasting:  Nothing to eat or drink after midnight the night before your procedure. This includes no ice, gum, hard candies, and/or tobacco products.    Medications:  Follow your doctor's instructions for taking any medications on the morning of your procedure.  If no instructions for taking medications were given, do not take any medications but bring your medications " in their bottles to your procedure check in.     Follow your doctor's preoperative instructions regarding skin prep, bowel prep, bathing, or showering prior to your procedure.  If any special soaps were provided to you, please use according to your doctor's instructions. If no instructions were given from your doctor, take a good bath or shower with antibacterial soap the night before and the morning of your procedure.  On the morning of procedure, wear loose, comfortable clothing.  No lotions, makeup, perfumes, colognes, deodorant, or jewelry to your procedure.  Removable items (glasses, contact lenses, dentures, retainers, hearing aids) need to be removed for your procedure.  Bring your storage containers for these items if you wear them.     Artificial nails, body jewelry, eyelash extensions, and/or hair extensions with metal clips are not allowed during your surgery.  If you currently wear any of these items, please arrange for them to be removed prior to your arrival to the hospital.     Outpatient or Same Day Surgeries:  Any patients receiving sedation/anesthesia are advised not to drive for 24 hours after their procedure.  We do not allow patients to drive themselves home after discharge.  If you are going home after your procedure, please have someone available to drive you home from the hospital.     You may call the Outpatient Surgery Department at [phone] with any questions or concerns.  We are looking forward to meeting you and taking great care of you for your procedure.  Thank you for choosing [facility] for your surgical needs.

## 2024-02-19 ENCOUNTER — ANESTHESIA (OUTPATIENT)
Dept: SURGERY | Facility: HOSPITAL | Age: 79
End: 2024-02-19
Payer: OTHER GOVERNMENT

## 2024-02-19 ENCOUNTER — HOSPITAL ENCOUNTER (OUTPATIENT)
Facility: HOSPITAL | Age: 79
Discharge: HOME | End: 2024-02-19
Attending: SURGERY | Admitting: SURGERY
Payer: OTHER GOVERNMENT

## 2024-02-19 VITALS
DIASTOLIC BLOOD PRESSURE: 72 MMHG | WEIGHT: 181 LBS | BODY MASS INDEX: 25.91 KG/M2 | TEMPERATURE: 98 F | HEIGHT: 70 IN | SYSTOLIC BLOOD PRESSURE: 164 MMHG | OXYGEN SATURATION: 100 % | RESPIRATION RATE: 16 BRPM | HEART RATE: 71 BPM

## 2024-02-19 DIAGNOSIS — D3A.8 NEUROENDOCRINE TUMOR OF PANCREAS: ICD-10-CM

## 2024-02-19 DIAGNOSIS — D3A.8 NEUROENDOCRINE TUMOR: ICD-10-CM

## 2024-02-19 LAB
GROUP & RH: NORMAL
INDIRECT COOMBS: NORMAL
POCT GLUCOSE: 114 MG/DL (ref 70–110)
POCT GLUCOSE: 91 MG/DL (ref 70–110)
SPECIMEN OUTDATE: NORMAL

## 2024-02-19 PROCEDURE — 36000710: Performed by: SURGERY

## 2024-02-19 PROCEDURE — 27201423 OPTIME MED/SURG SUP & DEVICES STERILE SUPPLY: Performed by: SURGERY

## 2024-02-19 PROCEDURE — D9220A PRA ANESTHESIA: Mod: ANES,,, | Performed by: ANESTHESIOLOGY

## 2024-02-19 PROCEDURE — 63600175 PHARM REV CODE 636 W HCPCS: Performed by: NURSE ANESTHETIST, CERTIFIED REGISTERED

## 2024-02-19 PROCEDURE — 36000711: Performed by: SURGERY

## 2024-02-19 PROCEDURE — 76937 US GUIDE VASCULAR ACCESS: CPT | Performed by: ANESTHESIOLOGY

## 2024-02-19 PROCEDURE — D9220A PRA ANESTHESIA: Mod: CRNA,,, | Performed by: NURSE ANESTHETIST, CERTIFIED REGISTERED

## 2024-02-19 PROCEDURE — 76937 US GUIDE VASCULAR ACCESS: CPT | Mod: 26,,, | Performed by: ANESTHESIOLOGY

## 2024-02-19 PROCEDURE — 37000008 HC ANESTHESIA 1ST 15 MINUTES: Performed by: SURGERY

## 2024-02-19 PROCEDURE — 47379 UNLISTED LAPS PX LIVER: CPT | Mod: AS,,, | Performed by: NURSE PRACTITIONER

## 2024-02-19 PROCEDURE — 25000003 PHARM REV CODE 250: Performed by: SURGERY

## 2024-02-19 PROCEDURE — 82962 GLUCOSE BLOOD TEST: CPT | Performed by: SURGERY

## 2024-02-19 PROCEDURE — A4216 STERILE WATER/SALINE, 10 ML: HCPCS | Performed by: SURGERY

## 2024-02-19 PROCEDURE — 71000016 HC POSTOP RECOV ADDL HR: Performed by: SURGERY

## 2024-02-19 PROCEDURE — 63600175 PHARM REV CODE 636 W HCPCS: Performed by: SURGERY

## 2024-02-19 PROCEDURE — 63600175 PHARM REV CODE 636 W HCPCS: Mod: JZ,JG | Performed by: SURGERY

## 2024-02-19 PROCEDURE — 71000015 HC POSTOP RECOV 1ST HR: Performed by: SURGERY

## 2024-02-19 PROCEDURE — 36620 INSERTION CATHETER ARTERY: CPT | Mod: 59,,, | Performed by: ANESTHESIOLOGY

## 2024-02-19 PROCEDURE — 71000033 HC RECOVERY, INTIAL HOUR: Performed by: SURGERY

## 2024-02-19 PROCEDURE — C1729 CATH, DRAINAGE: HCPCS | Performed by: SURGERY

## 2024-02-19 PROCEDURE — P9047 ALBUMIN (HUMAN), 25%, 50ML: HCPCS | Mod: JZ,JG | Performed by: NURSE ANESTHETIST, CERTIFIED REGISTERED

## 2024-02-19 PROCEDURE — C9290 INJ, BUPIVACAINE LIPOSOME: HCPCS | Performed by: SURGERY

## 2024-02-19 PROCEDURE — 37000009 HC ANESTHESIA EA ADD 15 MINS: Performed by: SURGERY

## 2024-02-19 PROCEDURE — 47379 UNLISTED LAPS PX LIVER: CPT | Mod: ,,, | Performed by: SURGERY

## 2024-02-19 PROCEDURE — 25000003 PHARM REV CODE 250: Performed by: NURSE ANESTHETIST, CERTIFIED REGISTERED

## 2024-02-19 PROCEDURE — 86901 BLOOD TYPING SEROLOGIC RH(D): CPT | Performed by: SURGERY

## 2024-02-19 RX ORDER — SODIUM CHLORIDE 0.9 % (FLUSH) 0.9 %
10 SYRINGE (ML) INJECTION
Status: DISCONTINUED | OUTPATIENT
Start: 2024-02-19 | End: 2024-02-21 | Stop reason: HOSPADM

## 2024-02-19 RX ORDER — HYDROCODONE BITARTRATE AND ACETAMINOPHEN 500; 5 MG/1; MG/1
TABLET ORAL
Status: DISCONTINUED | OUTPATIENT
Start: 2024-02-19 | End: 2024-02-21 | Stop reason: HOSPADM

## 2024-02-19 RX ORDER — ENOXAPARIN SODIUM 100 MG/ML
30 INJECTION SUBCUTANEOUS EVERY 24 HOURS
Status: DISCONTINUED | OUTPATIENT
Start: 2024-02-19 | End: 2024-02-21 | Stop reason: HOSPADM

## 2024-02-19 RX ORDER — ENOXAPARIN SODIUM 100 MG/ML
30 INJECTION SUBCUTANEOUS EVERY 24 HOURS
Status: DISCONTINUED | OUTPATIENT
Start: 2024-02-19 | End: 2024-02-19

## 2024-02-19 RX ORDER — ALVIMOPAN 12 MG/1
12 CAPSULE ORAL ONCE
Status: COMPLETED | OUTPATIENT
Start: 2024-02-19 | End: 2024-02-19

## 2024-02-19 RX ORDER — LIDOCAINE HYDROCHLORIDE 20 MG/ML
INJECTION, SOLUTION EPIDURAL; INFILTRATION; INTRACAUDAL; PERINEURAL
Status: DISCONTINUED | OUTPATIENT
Start: 2024-02-19 | End: 2024-02-19

## 2024-02-19 RX ORDER — GLYCOPYRROLATE 0.2 MG/ML
INJECTION INTRAMUSCULAR; INTRAVENOUS
Status: DISCONTINUED | OUTPATIENT
Start: 2024-02-19 | End: 2024-02-19

## 2024-02-19 RX ORDER — PROPOFOL 10 MG/ML
VIAL (ML) INTRAVENOUS
Status: DISCONTINUED | OUTPATIENT
Start: 2024-02-19 | End: 2024-02-19

## 2024-02-19 RX ORDER — DEXAMETHASONE SODIUM PHOSPHATE 4 MG/ML
INJECTION, SOLUTION INTRA-ARTICULAR; INTRALESIONAL; INTRAMUSCULAR; INTRAVENOUS; SOFT TISSUE
Status: DISCONTINUED | OUTPATIENT
Start: 2024-02-19 | End: 2024-02-19

## 2024-02-19 RX ORDER — PHENYLEPHRINE HYDROCHLORIDE 10 MG/ML
INJECTION INTRAVENOUS
Status: DISCONTINUED | OUTPATIENT
Start: 2024-02-19 | End: 2024-02-19

## 2024-02-19 RX ORDER — ROCURONIUM BROMIDE 10 MG/ML
INJECTION, SOLUTION INTRAVENOUS
Status: DISCONTINUED | OUTPATIENT
Start: 2024-02-19 | End: 2024-02-19

## 2024-02-19 RX ORDER — HYDROCHLOROTHIAZIDE 25 MG/1
25 TABLET ORAL DAILY
COMMUNITY

## 2024-02-19 RX ORDER — HYDROCODONE BITARTRATE AND ACETAMINOPHEN 5; 325 MG/1; MG/1
1 TABLET ORAL EVERY 6 HOURS PRN
Qty: 10 TABLET | Refills: 0 | Status: SHIPPED | OUTPATIENT
Start: 2024-02-19

## 2024-02-19 RX ORDER — SODIUM CHLORIDE 0.9 % (FLUSH) 0.9 %
SYRINGE (ML) INJECTION
Status: DISCONTINUED | OUTPATIENT
Start: 2024-02-19 | End: 2024-02-19 | Stop reason: HOSPADM

## 2024-02-19 RX ORDER — BUPIVACAINE HYDROCHLORIDE 5 MG/ML
INJECTION, SOLUTION EPIDURAL; INTRACAUDAL
Status: DISCONTINUED | OUTPATIENT
Start: 2024-02-19 | End: 2024-02-19 | Stop reason: HOSPADM

## 2024-02-19 RX ORDER — METRONIDAZOLE 500 MG/100ML
500 INJECTION, SOLUTION INTRAVENOUS
Status: COMPLETED | OUTPATIENT
Start: 2024-02-19 | End: 2024-02-19

## 2024-02-19 RX ORDER — ONDANSETRON HYDROCHLORIDE 2 MG/ML
INJECTION, SOLUTION INTRAVENOUS
Status: DISCONTINUED | OUTPATIENT
Start: 2024-02-19 | End: 2024-02-19

## 2024-02-19 RX ORDER — FENTANYL CITRATE 50 UG/ML
INJECTION, SOLUTION INTRAMUSCULAR; INTRAVENOUS
Status: DISCONTINUED | OUTPATIENT
Start: 2024-02-19 | End: 2024-02-19

## 2024-02-19 RX ORDER — ALBUMIN HUMAN 250 G/1000ML
SOLUTION INTRAVENOUS
Status: DISCONTINUED | OUTPATIENT
Start: 2024-02-19 | End: 2024-02-19

## 2024-02-19 RX ORDER — HYDROMORPHONE HYDROCHLORIDE 2 MG/ML
0.4 INJECTION, SOLUTION INTRAMUSCULAR; INTRAVENOUS; SUBCUTANEOUS EVERY 5 MIN PRN
Status: DISCONTINUED | OUTPATIENT
Start: 2024-02-19 | End: 2024-02-21 | Stop reason: HOSPADM

## 2024-02-19 RX ADMIN — ONDANSETRON 4 MG: 2 INJECTION INTRAMUSCULAR; INTRAVENOUS at 12:02

## 2024-02-19 RX ADMIN — PHENYLEPHRINE HYDROCHLORIDE 100 MCG: 10 INJECTION INTRAVENOUS at 11:02

## 2024-02-19 RX ADMIN — SUGAMMADEX 200 MG: 100 INJECTION, SOLUTION INTRAVENOUS at 12:02

## 2024-02-19 RX ADMIN — SODIUM CHLORIDE, SODIUM GLUCONATE, SODIUM ACETATE, POTASSIUM CHLORIDE AND MAGNESIUM CHLORIDE: 526; 502; 368; 37; 30 INJECTION, SOLUTION INTRAVENOUS at 10:02

## 2024-02-19 RX ADMIN — GLYCOPYRROLATE 0.2 MG: 0.2 INJECTION INTRAMUSCULAR; INTRAVENOUS at 10:02

## 2024-02-19 RX ADMIN — FENTANYL CITRATE 25 MCG: 50 INJECTION, SOLUTION INTRAMUSCULAR; INTRAVENOUS at 11:02

## 2024-02-19 RX ADMIN — ALVIMOPAN 12 MG: 12 CAPSULE ORAL at 07:02

## 2024-02-19 RX ADMIN — PROPOFOL 180 MG: 10 INJECTION, EMULSION INTRAVENOUS at 10:02

## 2024-02-19 RX ADMIN — FENTANYL CITRATE 50 MCG: 50 INJECTION, SOLUTION INTRAMUSCULAR; INTRAVENOUS at 10:02

## 2024-02-19 RX ADMIN — ROCURONIUM BROMIDE 50 MG: 10 SOLUTION INTRAVENOUS at 10:02

## 2024-02-19 RX ADMIN — ENOXAPARIN SODIUM 30 MG: 30 INJECTION SUBCUTANEOUS at 08:02

## 2024-02-19 RX ADMIN — PHENYLEPHRINE HYDROCHLORIDE 50 MCG: 10 INJECTION INTRAVENOUS at 11:02

## 2024-02-19 RX ADMIN — DEXAMETHASONE SODIUM PHOSPHATE 8 MG: 4 INJECTION, SOLUTION INTRA-ARTICULAR; INTRALESIONAL; INTRAMUSCULAR; INTRAVENOUS; SOFT TISSUE at 10:02

## 2024-02-19 RX ADMIN — LIDOCAINE HYDROCHLORIDE 4 ML: 20 INJECTION, SOLUTION EPIDURAL; INFILTRATION; INTRACAUDAL; PERINEURAL at 10:02

## 2024-02-19 RX ADMIN — METRONIDAZOLE 500 MG: 5 INJECTION, SOLUTION INTRAVENOUS at 11:02

## 2024-02-19 RX ADMIN — FENTANYL CITRATE 25 MCG: 50 INJECTION, SOLUTION INTRAMUSCULAR; INTRAVENOUS at 12:02

## 2024-02-19 RX ADMIN — ALBUMIN (HUMAN) 100 ML: 12.5 SOLUTION INTRAVENOUS at 11:02

## 2024-02-19 RX ADMIN — ERTAPENEM 1 G: 1 INJECTION INTRAMUSCULAR; INTRAVENOUS at 11:02

## 2024-02-19 RX ADMIN — PHENYLEPHRINE HYDROCHLORIDE 100 MCG: 10 INJECTION INTRAVENOUS at 10:02

## 2024-02-19 RX ADMIN — ROCURONIUM BROMIDE 30 MG: 10 SOLUTION INTRAVENOUS at 11:02

## 2024-02-19 NOTE — ANESTHESIA PROCEDURE NOTES
Arterial    Diagnosis: cancer    Patient location during procedure: pre-op  Timeout: 2/19/2024 10:00 AM  Procedure end time: 2/19/2024 10:05 AM    Staffing  Authorizing Provider: Omid Reddy MD  Performing Provider: Omid Reddy MD    Staffing  Performed by: Omid Reddy MD  Authorized by: Omid Reddy MD    Anesthesiologist was present at the time of the procedure.    Preanesthetic Checklist  Completed: patient identified, IV checked, site marked, risks and benefits discussed, surgical consent, monitors and equipment checked, pre-op evaluation, timeout performed and anesthesia consent givenArterial  Skin Prep: chlorhexidine gluconate  Local Infiltration: lidocaine  Orientation: right  Location: radial    Catheter Size: 20 G  Catheter placement by Ultrasound guidance. Heme positive aspiration all ports.   Vessel Caliber: medium, patent  Needle advanced into vessel with real time Ultrasound guidance.  Guidewire confirmed in vessel.  Image recorded and saved.Insertion Attempts: 1  Assessment  Dressing: secured with tape and tegaderm

## 2024-02-19 NOTE — ANESTHESIA PREPROCEDURE EVALUATION
02/19/2024  Bobby Barbosa is a 79 y.o., male.      Pre-op Assessment    I have reviewed the Patient Summary Reports.     I have reviewed the Nursing Notes. I have reviewed the NPO Status.      Review of Systems      Physical Exam  General: Well nourished, Cooperative, Alert and Oriented    Airway:  Mallampati: II   Mouth Opening: Normal  TM Distance: Normal  Tongue: Normal    Dental:  Edentulous    Chest/Lungs:  Clear to auscultation    Heart:  Rate: Normal        Anesthesia Plan  Type of Anesthesia, risks & benefits discussed:    Anesthesia Type: Gen ETT, Regional  Intra-op Monitoring Plan: Standard ASA Monitors and Art Line  Post Op Pain Control Plan: multimodal analgesia  Induction:  IV  Airway Plan: Direct  Informed Consent: Informed consent signed with the Patient and all parties understand the risks and agree with anesthesia plan.  All questions answered.   ASA Score: 3    Ready For Surgery From Anesthesia Perspective.     .

## 2024-02-19 NOTE — ANESTHESIA POSTPROCEDURE EVALUATION
Anesthesia Post Evaluation    Patient: Bobby Barbosa    Procedure(s) Performed: Procedure(s) (LRB):  ROBOTIC LAPAROSCOPY, EXPLORATORY (N/A)  BIOPSY, LIVER, LAPAROSCOPIC    Final Anesthesia Type: general      Patient location during evaluation: PACU  Patient participation: Yes- Able to Participate  Level of consciousness: awake  Post-procedure vital signs: reviewed and stable  Pain management: adequate  Airway patency: patent  BASIL mitigation strategies: Postoperative administration of CPAP, nasopharyngeal airway, or oral appliance in the postanesthesia care unit (PACU)  PONV status at discharge: No PONV  Anesthetic complications: no      Cardiovascular status: hemodynamically stable  Respiratory status: unassisted and spontaneous ventilation  Hydration status: euvolemic  Follow-up not needed.              Vitals Value Taken Time   /71 02/19/24 1434   Temp 36.4 °C (97.5 °F) 02/19/24 1337   Pulse 70 02/19/24 1434   Resp 16 02/19/24 1434   SpO2 98 % 02/19/24 1434         Event Time   Out of Recovery 13:25:00         Pain/Dominic Score: Dominic Score: 9 (2/19/2024  1:34 PM)

## 2024-02-19 NOTE — ANESTHESIA PROCEDURE NOTES
Intubation    Date/Time: 2/19/2024 10:43 AM    Performed by: Manuel Deutsch CRNA  Authorized by: Omid Reddy MD    Intubation:     Induction:  Intravenous    Intubated:  Postinduction    Mask Ventilation:  Easy with oral airway    Attempts:  1    Attempted By:  CRNA    Method of Intubation:  Video laryngoscopy    Blade:  Cohn 4    Laryngeal View Grade: Grade I - full view of cords      Difficult Airway Encountered?: No      Complications:  None    Airway Device:  Oral endotracheal tube    Airway Device Size:  7.5    Style/Cuff Inflation:  Cuffed (inflated to minimal occlusive pressure)    Inflation Amount (mL):  6    Tube secured:  22    Secured at:  The lips    Placement Verified By:  Capnometry    Complicating Factors:  None    Findings Post-Intubation:  BS equal bilateral

## 2024-02-19 NOTE — DISCHARGE INSTRUCTIONS
-NO driving and NO alcohol consumption for 24 hours and while taking narcotic pain medications.    -If you have a blue armband, you have received Exparel. Exparel is a medication that is used to ease pain at the incision sites. It can last up to 5 days. (see attached)    -Keep sites clean and dry for 2 days. Ok to shower afterwards. Do not submerge sites under water.    -NO heavy lifting. Do not lift objects greater than 10 lbs. Use caution when bending, pulling, pushing, lifting.    -Monitor sites for infection: redness, swelling, drainage/pus/foul odor, fever, chills.    -Report to your nearest ER if you experience and/or notify your provider if you experience any SUDDEN/SEVERE chest/abdominal pain, weakness, trouble breathing, uncontrolled pain.    BLEEDING: If surgical site begins to bleed , contact your doctor or go to ER    NAUSEA: due to the anesthesia, you may experience nausea for up to 24 hours. If nausea and vomiting last longer, contact your doctor.     INFECTION:  watch for any signs or symptoms of infection such as chills, fever, redness or drainage at surgical site. Notify your doctor     PAIN : take your pain medications as directed. If the pain medications are not helping, notify your doctor.

## 2024-02-19 NOTE — TRANSFER OF CARE
"Anesthesia Transfer of Care Note    Patient: Bobby Barbosa    Procedure(s) Performed: Procedure(s) (LRB):  ROBOTIC LAPAROSCOPY, EXPLORATORY (N/A)  BIOPSY, LIVER, LAPAROSCOPIC    Patient location: PACU    Transport from OR: Transported from OR on room air with adequate spontaneous ventilation    Post pain: adequate analgesia    Post assessment: no apparent anesthetic complications and tolerated procedure well    Post vital signs: stable    Level of consciousness: awake and alert    Nausea/Vomiting: no nausea/vomiting    Complications: none    Transfer of care protocol was followed      Last vitals: Visit Vitals  /80   Pulse 70   Temp 36.7 °C (98 °F) (Oral)   Resp 13   Ht 5' 10" (1.778 m)   Wt 82.1 kg (181 lb)   SpO2 100%   BMI 25.97 kg/m²     "

## 2024-02-21 LAB — PSYCHE PATHOLOGY RESULT: NORMAL

## 2024-02-21 NOTE — DISCHARGE SUMMARY
[facility] Periop [facility]  Discharge Note  Short Stay    Procedure(s) (LRB):  ROBOTIC LAPAROSCOPY, EXPLORATORY (N/A)  BIOPSY, LIVER, LAPAROSCOPIC      OUTCOME: Patient tolerated treatment/procedure well without complication and is now ready for discharge.    DISPOSITION: Home or Self Care    FINAL DIAGNOSIS:  <principal problem not specified>    FOLLOWUP: In clinic    DISCHARGE INSTRUCTIONS:  No discharge procedures on file.      Clinical Reference Documents Added to Patient Instructions         Document    CONSTIPATION, ADULT ED (ENGLISH)    EXPLORATORY LAPAROTOMY DISCHARGE INSTRUCTIONS (ENGLISH)    LIVER BIOPSY DISCHARGE INSTRUCTIONS (ENGLISH)    ROBOT-ASSISTED SURGERY (ENGLISH)    WOUND CARE DISCHARGE INSTRUCTIONS (ENGLISH)            TIME SPENT ON DISCHARGE:  minutes

## 2024-02-24 NOTE — OP NOTE
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 Diagnostic laparoscopy  2. Laparoscopic wedge biopsy left lobe liver lesion     Anesthesia:  GETA    EBL:  25 cc    Specimens:  Left lobe liver lesion    Findings:  Too numerous to count surface liver lesions consistent with metastatic disease were identified on diagnostic laparoscopy.  Representative wedge biopsies were obtained and frozen section confirmed malignancy.  Therefore, we did not proceed with aggressive approach to the primary tumor.    Procedure in detail:  After informed consent was obtained patient brought to operating placed supine position.  General endotracheal anesthesia administered without difficulty.  The patient's abdomen prepped and draped sterile fashion.  After infiltration with local anesthesia supraumbilical incision was made optical trocar used into peritoneal cavity under direct vision.  Pneumoperitoneum was achieved without difficulty.  Additional ports were placed under direct vision.  The abdomen was explored laparoscopically there was no evidence of peritoneal based disease.  There were numerous bilobar liver lesions present, too numerous to count consistent with metastatic disease.  Using sharp dissection with endo Mingo wedge resection was performed dissecting through the parenchyma using electrocautery scissors.  Representative samples were sent for frozen section and confirmed metastatic carcinoma.  Therefore, we did not proceed with Whipple procedure in the face of extensive metastatic disease.  Meticulous hemostasis was achieved at the hepatic transection margin using electrocautery.  Excellent hemostasis was noted.  Ports were removed pneumoperitoneum was relieved  port sites were closed with absorbable suture sterile dressings were applied the patient tolerated the procedure well.    Significant surgical tasks conducted by the assistant:  The skilled assistance of the nurse practitioner GLADIS Oconnor was necessary for the successful completion of this case.  She was essential for the proper positioning of the patient, manipulation of instruments, proper exposure, manipulation of tissue, and wound closure        Saeed Heredia MD  Surgical Oncology  Complex General, Gastrointestinal and Hepatobiliary Surgery

## 2024-02-27 ENCOUNTER — OFFICE VISIT (OUTPATIENT)
Dept: SURGICAL ONCOLOGY | Facility: CLINIC | Age: 79
End: 2024-02-27
Payer: OTHER GOVERNMENT

## 2024-02-27 VITALS
HEIGHT: 70 IN | DIASTOLIC BLOOD PRESSURE: 72 MMHG | HEART RATE: 70 BPM | BODY MASS INDEX: 26.43 KG/M2 | SYSTOLIC BLOOD PRESSURE: 120 MMHG | WEIGHT: 184.63 LBS

## 2024-02-27 DIAGNOSIS — D3A.8 NEUROENDOCRINE TUMOR OF PANCREAS: Primary | ICD-10-CM

## 2024-02-27 PROCEDURE — 99999 PR PBB SHADOW E&M-EST. PATIENT-LVL III: CPT | Mod: PBBFAC,,, | Performed by: NURSE PRACTITIONER

## 2024-02-27 PROCEDURE — 99024 POSTOP FOLLOW-UP VISIT: CPT | Mod: ,,, | Performed by: NURSE PRACTITIONER

## 2024-02-27 PROCEDURE — 99213 OFFICE O/P EST LOW 20 MIN: CPT | Mod: PBBFAC | Performed by: NURSE PRACTITIONER

## 2024-02-27 NOTE — PROGRESS NOTES
POST OP DIAG LAPAROSCOPY WITH LIVER BX  (INTENDED TO HAVE WHIPPLE PROCEDURE BUT ABORTED DUE TO EVIDENCE OF METASTATIC DISEASE)  DX NEUROENDOCRINE TUMOR OF THE PANCREAS    PT LOOKS GOOD TODAY  MULTIPLE DAUGHTERS PRESENT  NO REPORTS OF PAIN  EATING  HAVING BMS    ABD SOFT  INCISIONS HEALING WELL    PATH: METASTATIC NEUROENDOCRINE TUMOR  DR ASH REVIEWS PATH  PATH DISCUSSED WITH PT AND DAUGHTERS AND QUESTIONS ANSWERED    [facility] IN [place]  WILL PUT REFERRAL IN  PT GIVEN COPY OF ALL NOTES  THEY KNOW TO CALL WITH ANY PROBLEMS    RTC PRN

## 2024-02-29 ENCOUNTER — DOCUMENTATION ONLY (OUTPATIENT)
Dept: SURGICAL ONCOLOGY | Facility: CLINIC | Age: 79
End: 2024-02-29
Payer: MEDICARE

## 2024-02-29 NOTE — PROGRESS NOTES
MARYSE/AUTH request faxed to [facility] for patient to see Med Onc. Once they approve and fax copy of auth to me, I will send referral to [facility] in [facility] for them to get patient set up with a med onc there. Reminder set to f/u with [facility] in 2 weeks if I haven't received auth back by then. cpl

## 2024-03-06 ENCOUNTER — TELEPHONE (OUTPATIENT)
Dept: SURGICAL ONCOLOGY | Facility: CLINIC | Age: 79
End: 2024-03-06
Payer: MEDICARE

## 2024-03-06 NOTE — TELEPHONE ENCOUNTER
"Phone call received from patient's daughter, Stacia. She stated that she was aware that we are in the process of contacting the [facility] regarding referring him to a Medical Oncologist but that her father is now getting worse. She stated that he is now more lethargic and dizzy than he was before and is also having trouble eating at all. He hasn't eaten much all week. She also stated he looks as if he is losing weight and they are worried he won't be here much longer. She asked if we were able to use his "regular" insurance vs [facility] to get him in somewhere sooner. I told her I would have to look at the cards and have a copy before I could handle the referral but she clarified that the patient has  insurance. I did inform her that it depends on what kind of  because  is [facility] insurance but that I'd look at the cards. She is going to set up a portal for patient and upload cards. I also discussed with our supervisor Archana who stated if it is , then unfortunately it will still go through [facility]. & even if it was a "different" insurance, the insurance may not cover the diagnosis anyways because he had already used the [facility] for it. Once cards are reviewed, I will notify the patient on the next step forward. I also relayed this to Libia Bentley RN and Martha Romero NP as to where we are in this process.   "

## 2024-03-14 ENCOUNTER — DOCUMENTATION ONLY (OUTPATIENT)
Dept: SURGICAL ONCOLOGY | Facility: CLINIC | Age: 79
End: 2024-03-14
Payer: MEDICARE

## 2024-03-14 NOTE — PROGRESS NOTES
Received reminder to follow up on authorization for [facility] for patient to see [facility]. I emailed Bharat at the [facility] to touch base as to where we are currently with the approval for the consult so we could send the referral. Once approved, I or Luli Draper MA will notify the patients family on next steps. cpl

## 2024-04-05 ENCOUNTER — TELEPHONE (OUTPATIENT)
Dept: SURGICAL ONCOLOGY | Facility: CLINIC | Age: 79
End: 2024-04-05
Payer: MEDICARE

## 2024-04-24 ENCOUNTER — DOCUMENTATION ONLY (OUTPATIENT)
Dept: SURGICAL ONCOLOGY | Facility: CLINIC | Age: 79
End: 2024-04-24
Payer: MEDICARE

## 2024-04-24 NOTE — PROGRESS NOTES
Eloisa Parra, Mr. Rosalse's daughter called to inform that Mr. Rosales passed away on April 10th & to please let Dr. Heredia know.      Post-Care Instructions: I reviewed with the patient in detail post-care instructions. Patient should stay away from the sun and wear sun protection until treated areas are fully healed.

## (undated) DEVICE — DRESSING TELFA N ADH 3X8

## (undated) DEVICE — DRAPE STERI LONG

## (undated) DEVICE — COVER PROBE US 5.5X58L NON LTX

## (undated) DEVICE — SUT MCRYL PLUS 4-0 PS2 27IN

## (undated) DEVICE — BAG INZII TISS RETRV 12/15MM

## (undated) DEVICE — TUBE FEEDING PURPLE 5FRX40CM

## (undated) DEVICE — TROCAR KII FIOS ZTHREAD 11X100

## (undated) DEVICE — ELECTRODE PATIENT RETURN DISP

## (undated) DEVICE — DRAPE COLUMN DAVINCI XI

## (undated) DEVICE — GLOVE PROTEXIS HYDROGEL SZ6.5

## (undated) DEVICE — SUT SILK 2-0 STRANDS 30IN

## (undated) DEVICE — GLOVE PROTEXIS HYDROGEL SZ7

## (undated) DEVICE — SEAL UNIVERSAL 5MM-8MM XI

## (undated) DEVICE — SET TRI-LUMEN FILTERED TUBE

## (undated) DEVICE — GLOVE PROTEXIS BLUE LATEX 7

## (undated) DEVICE — SOL CLEARIFY VISUALIZATION LAP

## (undated) DEVICE — SEALER VESSEL EXTEND

## (undated) DEVICE — IRRIGATOR SUCTION W/TIP

## (undated) DEVICE — DRAPE LEGGINGS CUFF 31X48IN

## (undated) DEVICE — CANNULA REDUCER 12-8MM

## (undated) DEVICE — SYR ONLY LUER LOCK 20CC

## (undated) DEVICE — PORT AIRSEAL 12/120MM LPI

## (undated) DEVICE — SUT VICRYL PLUS 3-0 SH 18IN

## (undated) DEVICE — SCISSOR CURVED ENDOPATH 5MM

## (undated) DEVICE — RESERVOIR JACKSON-PRATT 100CC

## (undated) DEVICE — CANNULA LAP SEAL Z THRD 5X100

## (undated) DEVICE — CANNULA SEAL 12MM

## (undated) DEVICE — SUT PROLENE 4-0 SH BLU 36IN

## (undated) DEVICE — COVER TIP CURVED SCISSORS XI

## (undated) DEVICE — HEMOSTAT SURGICEL 2X14IN

## (undated) DEVICE — SUT PDS PLUS MONO 1 CT 36IN

## (undated) DEVICE — SUT VLOC 90 3-0 V-20 NDL 6

## (undated) DEVICE — CLIP HEMO-LOK ML

## (undated) DEVICE — COVER MAYO STAND REINFRCD 30

## (undated) DEVICE — SOL NACL IRR 1000ML BTL

## (undated) DEVICE — APPLIER LIGACLIP SM 9.38IN

## (undated) DEVICE — OBTURATOR BLADELESS 8MM XI CLR

## (undated) DEVICE — CONTAINER SPECIMEN SCREW 4OZ

## (undated) DEVICE — SUT 1 36IN PDS II VIO MONO

## (undated) DEVICE — SUT VLOC 180 CV-23 NDL

## (undated) DEVICE — NDL 20GX1-1/2IN IB

## (undated) DEVICE — SOL ELECTROLUBE ANTI-STIC

## (undated) DEVICE — DRAPE ARM DAVINCI XI

## (undated) DEVICE — TROCAR ENDO Z THREAD KII 5X100

## (undated) DEVICE — SUT 2/0 30IN SILK BLK BRAI

## (undated) DEVICE — TRAY CATH FOL SIL URIMTR 16FR

## (undated) DEVICE — HOLDER STRIP-T SELF ADH 2X10IN

## (undated) DEVICE — ADHESIVE DERMABOND ADVANCED

## (undated) DEVICE — KIT SURGICAL TURNOVER

## (undated) DEVICE — SUT SILK 3-0 STRANDS 30IN

## (undated) DEVICE — SUT SILK 0 SH 30IN BLK BR

## (undated) DEVICE — PACK DRAPE UNIVERSAL CONVERTOR

## (undated) DEVICE — Device

## (undated) DEVICE — SPONGE PREMIERPRO 18X4IN

## (undated) DEVICE — SUT VICRYL+ 27 UR-6 VIOL

## (undated) DEVICE — SUT PDS II 5-0 RB-1RB-1 VI

## (undated) DEVICE — SUT PROLENE BLUE 5-0 RB-1 24IN

## (undated) DEVICE — DRAIN JACKSON PRATT TRCR 19FR

## (undated) DEVICE — PAD PINK TRENDELENBURG POS XL